# Patient Record
Sex: FEMALE | Race: WHITE | NOT HISPANIC OR LATINO | URBAN - METROPOLITAN AREA
[De-identification: names, ages, dates, MRNs, and addresses within clinical notes are randomized per-mention and may not be internally consistent; named-entity substitution may affect disease eponyms.]

---

## 2017-10-28 ENCOUNTER — EMERGENCY (EMERGENCY)
Facility: HOSPITAL | Age: 22
LOS: 0 days | Discharge: HOME | End: 2017-10-28
Attending: EMERGENCY MEDICINE | Admitting: EMERGENCY MEDICINE
Payer: COMMERCIAL

## 2017-10-28 VITALS
OXYGEN SATURATION: 100 % | HEART RATE: 60 BPM | DIASTOLIC BLOOD PRESSURE: 68 MMHG | RESPIRATION RATE: 17 BRPM | SYSTOLIC BLOOD PRESSURE: 105 MMHG | TEMPERATURE: 98 F

## 2017-10-28 VITALS — HEIGHT: 65 IN | WEIGHT: 100.09 LBS

## 2017-10-28 DIAGNOSIS — R11.2 NAUSEA WITH VOMITING, UNSPECIFIED: ICD-10-CM

## 2017-10-28 DIAGNOSIS — S09.90XA UNSPECIFIED INJURY OF HEAD, INITIAL ENCOUNTER: ICD-10-CM

## 2017-10-28 DIAGNOSIS — R42 DIZZINESS AND GIDDINESS: ICD-10-CM
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
## 2017-10-28 DIAGNOSIS — S01.01XA LACERATION WITHOUT FOREIGN BODY OF SCALP, INITIAL ENCOUNTER: ICD-10-CM

## 2017-10-28 PROCEDURE — 70450 CT HEAD/BRAIN W/O DYE: CPT | Mod: 26

## 2017-10-28 PROCEDURE — 99284 EMERGENCY DEPT VISIT MOD MDM: CPT | Mod: 25

## 2017-10-28 PROCEDURE — 12001 RPR S/N/AX/GEN/TRNK 2.5CM/<: CPT

## 2017-10-28 NOTE — ED PROVIDER NOTE - OBJECTIVE STATEMENT
21 y/o F hx anxiety presents after fall. Pt fell from standing height approx 1 hour pta, mechanical fall. -LOC, reported mild dizziness that has resolved. Denies vision changes, N/V. Ambulatory w/o issue, mild bleeding continues. +etoh tonight however pt appears clinically sober on exam. Immunizations utd

## 2017-10-28 NOTE — ED PROVIDER NOTE - CARE PLAN
Principal Discharge DX:	Scalp laceration, initial encounter  Secondary Diagnosis:	Closed head injury

## 2017-10-28 NOTE — ED ADULT TRIAGE NOTE - CHIEF COMPLAINT QUOTE
Trip and fall. -LOC. Hit back of head on floor. Small laceration to back of head. States she had a few drinks prior to fall so she does not have any pain. Ambulating without difficulty. A&Ox4. Does not appear intoxicated. Bleeding controlled.

## 2017-11-06 ENCOUNTER — EMERGENCY (EMERGENCY)
Facility: HOSPITAL | Age: 22
LOS: 0 days | Discharge: HOME | End: 2017-11-06
Attending: EMERGENCY MEDICINE | Admitting: EMERGENCY MEDICINE
Payer: COMMERCIAL

## 2017-11-06 VITALS — WEIGHT: 95.02 LBS

## 2017-11-06 VITALS
OXYGEN SATURATION: 100 % | HEART RATE: 64 BPM | DIASTOLIC BLOOD PRESSURE: 80 MMHG | SYSTOLIC BLOOD PRESSURE: 110 MMHG | RESPIRATION RATE: 16 BRPM | TEMPERATURE: 100 F

## 2017-11-06 DIAGNOSIS — S01.01XA LACERATION WITHOUT FOREIGN BODY OF SCALP, INITIAL ENCOUNTER: ICD-10-CM
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
## 2017-11-06 DIAGNOSIS — S01.01XD LACERATION WITHOUT FOREIGN BODY OF SCALP, SUBSEQUENT ENCOUNTER: ICD-10-CM

## 2017-11-06 PROCEDURE — 99283 EMERGENCY DEPT VISIT LOW MDM: CPT

## 2017-11-06 NOTE — ED STATDOCS - OBJECTIVE STATEMENT
22 y-o Female with no PMHX presents to the ED for suture removal. Pt was here last week with Head laceration. Pt is no here for suture removal. 22 y-o Female with no PMHX presents to the ED for staple removal. Pt was here last week with Head laceration after fall. Healing well, feeling better, no fevers or chills

## 2017-11-06 NOTE — ED STATDOCS - ENMT, MLM
Nasal mucosa clear.  Mouth with normal mucosa  Throat has no vesicles, no oropharyngeal exudates and uvula is midline. Mouth with normal mucosa

## 2017-11-06 NOTE — ED STATDOCS - PROGRESS NOTE DETAILS
signed Maricarmen James PA-C Pt seen initially in intake by Dr Anni Barney.   Pt here for staple removal from occipital lac 9 days ago. Denies fever, pain, or concerns. 2 staples removed without incident, wound well-healing. f/u PMD PRN. Pt feeling well, pt and family agree with DC and plan of care.

## 2017-11-06 NOTE — ED STATDOCS - SKIN, MLM
skin normal color for race, warm, dry and intact. Two staple to back of head, well healing. skin normal color for race, warm, dry and intact. Two staples to back of head, well healing.

## 2017-11-06 NOTE — ED STATDOCS - ATTENDING CONTRIBUTION TO CARE
I, Arlene Barney MD, personally saw the patient with ACP.  I have personally performed a face to face diagnostic evaluation on this patient.  I have reviewed the ACP note and agree with the history, exam, and plan of care, except as noted.

## 2022-07-22 NOTE — ED ADULT NURSE NOTE - MUSCULOSKELETAL ASSESSMENT
WDL Detail Level: Zone Render In Strict Bullet Format?: No Modify Regimen: Recommended using Vanicream moisturizer, sunscreen, and cleanser.\\nAfter shower, gently pat dry (never rub) and apply fragrance-free moisturizer within first 2 mins while still damp to seal in moisture. Avoid long and hot shower.\\nMoisturize as frequent prn\\nTo reduce irritation, refrigerate Protopic and/or dilute w/ Vaseline  before using. Initiate Treatment: mometasone 0.1 % topical ointment once daily to eczema regions arms and legs. Topcoat with moisturizer.\\nProtopic 0.1 % topical ointment -Apply twice daily around eyes, ears, and neck for maintenance.

## 2024-06-21 ENCOUNTER — OUTPATIENT (OUTPATIENT)
Dept: OUTPATIENT SERVICES | Facility: HOSPITAL | Age: 29
LOS: 1 days | End: 2024-06-21
Payer: COMMERCIAL

## 2024-06-21 ENCOUNTER — APPOINTMENT (OUTPATIENT)
Dept: RADIOLOGY | Facility: CLINIC | Age: 29
End: 2024-06-21

## 2024-06-21 DIAGNOSIS — Z00.8 ENCOUNTER FOR OTHER GENERAL EXAMINATION: ICD-10-CM

## 2024-06-21 DIAGNOSIS — Z13.820 ENCOUNTER FOR SCREENING FOR OSTEOPOROSIS: ICD-10-CM

## 2024-06-21 PROCEDURE — 77080 DXA BONE DENSITY AXIAL: CPT

## 2024-06-21 PROCEDURE — 77080 DXA BONE DENSITY AXIAL: CPT | Mod: 26
